# Patient Record
Sex: MALE | Race: WHITE
[De-identification: names, ages, dates, MRNs, and addresses within clinical notes are randomized per-mention and may not be internally consistent; named-entity substitution may affect disease eponyms.]

---

## 2017-03-27 ENCOUNTER — HOSPITAL ENCOUNTER (EMERGENCY)
Dept: HOSPITAL 53 - M ED | Age: 47
Discharge: HOME | End: 2017-03-27
Payer: COMMERCIAL

## 2017-03-27 VITALS — SYSTOLIC BLOOD PRESSURE: 148 MMHG | DIASTOLIC BLOOD PRESSURE: 86 MMHG

## 2017-03-27 VITALS — BODY MASS INDEX: 25.67 KG/M2 | WEIGHT: 200 LBS | HEIGHT: 74 IN

## 2017-03-27 DIAGNOSIS — R00.2: Primary | ICD-10-CM

## 2017-03-27 LAB
ANION GAP SERPL CALC-SCNC: 8 MEQ/L (ref 8–16)
BASOPHILS # BLD AUTO: 0 K/MM3 (ref 0–0.2)
BASOPHILS NFR BLD AUTO: 0.8 % (ref 0–1)
BUN SERPL-MCNC: 17 MG/DL (ref 7–18)
CALCIUM SERPL-MCNC: 9.2 MG/DL (ref 8.5–10.1)
CHLORIDE SERPL-SCNC: 102 MEQ/L (ref 98–107)
CO2 SERPL-SCNC: 31 MEQ/L (ref 21–32)
CREAT SERPL-MCNC: 0.81 MG/DL (ref 0.7–1.3)
EOSINOPHIL # BLD AUTO: 0.1 K/MM3 (ref 0–0.5)
EOSINOPHIL NFR BLD AUTO: 1.4 % (ref 0–3)
ERYTHROCYTE [DISTWIDTH] IN BLOOD BY AUTOMATED COUNT: 12.2 % (ref 11.5–14.5)
GFR SERPL CREATININE-BSD FRML MDRD: > 60 ML/MIN/{1.73_M2} (ref 60–?)
GLUCOSE SERPL-MCNC: 102 MG/DL (ref 70–105)
LARGE UNSTAINED CELL #: 0.2 K/MM3 (ref 0–0.4)
LARGE UNSTAINED CELL %: 2.7 % (ref 0–4)
LYMPHOCYTES # BLD AUTO: 2.5 K/MM3 (ref 1.5–4.5)
LYMPHOCYTES NFR BLD AUTO: 34.9 % (ref 24–44)
MAGNESIUM SERPL-MCNC: 2.2 MG/DL (ref 1.8–2.4)
MCH RBC QN AUTO: 31.4 PG (ref 27–33)
MCHC RBC AUTO-ENTMCNC: 34.2 G/DL (ref 32–36.5)
MCV RBC AUTO: 91.9 FL (ref 80–96)
MONOCYTES # BLD AUTO: 0.4 K/MM3 (ref 0–0.8)
MONOCYTES NFR BLD AUTO: 5.5 % (ref 0–5)
NEUTROPHILS # BLD AUTO: 3.8 K/MM3 (ref 1.8–7.7)
NEUTROPHILS NFR BLD AUTO: 54.7 % (ref 36–66)
PLATELET # BLD AUTO: 317 K/MM3 (ref 150–450)
POTASSIUM SERPL-SCNC: 3.8 MEQ/L (ref 3.5–5.1)
SODIUM SERPL-SCNC: 141 MEQ/L (ref 136–145)
WBC # BLD AUTO: 7 K/MM3 (ref 4–10)

## 2017-03-27 NOTE — REP
Clinical:  Chest pain .

 

Comparison: 09/08/2009 .

 

Technique:  PA and lateral.

 

Findings:

The mediastinum and cardiac silhouette are normal.  The lung fields are clear and

without acute consolidation, effusion, or pneumothorax.  The skeletal structures

are intact and normal.

 

Impression:

1.   No acute cardiopulmonary process.

 

 

Signed by

Augusto Marin MD 03/27/2017 07:39 A

## 2017-03-27 NOTE — ECGEPIP
Stationary ECG Study

                           [facility] - ED

                                       

                                       Test Date:    2017

Pat Name:     HARIKA HARDING          Department:   

Patient ID:   [id number]                 Room:         -

Gender:       M                        Technician:   [email or web address]

:          1970               Requested By: ALLYN AMAYA

Order Number: [id number]     Reading MD:   Yisel Rascon

                                 Measurements

Intervals                              Axis          

Rate:         98                       P:            58

KS:           165                      QRS:          7

QRSD:         102                      T:            42

QT:           329                                    

QTc:          420                                    

                           Interpretive Statements

SINUS RHYTHM

BASELINE ARTIFACT LIMITS INTERPRETATION

NO PRIOR FOR COMPARISON

Electronically Signed On 3- 9:41:55 EDT by Yisel Rascon

## 2019-02-25 ENCOUNTER — HOSPITAL ENCOUNTER (OUTPATIENT)
Dept: HOSPITAL 53 - M LAB REF | Age: 49
End: 2019-02-25
Attending: NURSE PRACTITIONER
Payer: COMMERCIAL

## 2019-02-25 DIAGNOSIS — R35.0: Primary | ICD-10-CM

## 2022-12-16 ENCOUNTER — HOSPITAL ENCOUNTER (EMERGENCY)
Dept: HOSPITAL 53 - M ED | Age: 52
Discharge: HOME | End: 2022-12-16
Payer: COMMERCIAL

## 2022-12-16 VITALS — HEIGHT: 74 IN | WEIGHT: 205.03 LBS | BODY MASS INDEX: 26.31 KG/M2

## 2022-12-16 VITALS — DIASTOLIC BLOOD PRESSURE: 71 MMHG | SYSTOLIC BLOOD PRESSURE: 116 MMHG

## 2022-12-16 DIAGNOSIS — N21.0: Primary | ICD-10-CM

## 2022-12-16 DIAGNOSIS — N13.4: ICD-10-CM

## 2022-12-16 DIAGNOSIS — R00.2: ICD-10-CM

## 2022-12-16 DIAGNOSIS — Z82.49: ICD-10-CM

## 2022-12-16 DIAGNOSIS — N23: ICD-10-CM

## 2022-12-16 DIAGNOSIS — Z87.891: ICD-10-CM

## 2022-12-16 DIAGNOSIS — Z87.442: ICD-10-CM

## 2022-12-16 LAB
ALBUMIN SERPL BCG-MCNC: 3.7 G/DL (ref 3.2–5.2)
ALP SERPL-CCNC: 70 U/L (ref 46–116)
ALT SERPL W P-5'-P-CCNC: 27 U/L (ref 7–40)
AST SERPL-CCNC: 18 U/L (ref ?–34)
BASOPHILS # BLD AUTO: 0 10^3/UL (ref 0–0.2)
BASOPHILS NFR BLD AUTO: 0.2 % (ref 0–1)
BILIRUB CONJ SERPL-MCNC: 0.1 MG/DL (ref ?–0.4)
BILIRUB SERPL-MCNC: 0.5 MG/DL (ref 0.3–1.2)
BUN SERPL-MCNC: 19 MG/DL (ref 9–23)
CALCIUM SERPL-MCNC: 9.5 MG/DL (ref 8.5–10.1)
CHLORIDE SERPL-SCNC: 105 MMOL/L (ref 98–107)
CO2 SERPL-SCNC: 22 MMOL/L (ref 20–31)
CREAT SERPL-MCNC: 0.79 MG/DL (ref 0.7–1.3)
EOSINOPHIL # BLD AUTO: 0 10^3/UL (ref 0–0.5)
EOSINOPHIL NFR BLD AUTO: 0 % (ref 0–3)
GFR SERPL CREATININE-BSD FRML MDRD: > 60 ML/MIN/{1.73_M2} (ref 56–?)
GLUCOSE SERPL-MCNC: 119 MG/DL (ref 60–100)
HCT VFR BLD AUTO: 44.1 % (ref 42–52)
HGB BLD-MCNC: 14.6 G/DL (ref 13.5–17.5)
LIPASE SERPL-CCNC: 28 U/L (ref 12–53)
LYMPHOCYTES # BLD AUTO: 0.8 10^3/UL (ref 1.5–5)
LYMPHOCYTES NFR BLD AUTO: 5.2 % (ref 24–44)
MCH RBC QN AUTO: 30.8 PG (ref 27–33)
MCHC RBC AUTO-ENTMCNC: 33.1 G/DL (ref 32–36.5)
MCV RBC AUTO: 93 FL (ref 80–96)
MONOCYTES # BLD AUTO: 0.5 10^3/UL (ref 0–0.8)
MONOCYTES NFR BLD AUTO: 3.2 % (ref 2–8)
NEUTROPHILS # BLD AUTO: 13.7 10^3/UL (ref 1.5–8.5)
NEUTROPHILS NFR BLD AUTO: 90.9 % (ref 36–66)
PLATELET # BLD AUTO: 318 10^3/UL (ref 150–450)
POTASSIUM SERPL-SCNC: 4.3 MMOL/L (ref 3.5–5.1)
PROT SERPL-MCNC: 7.1 G/DL (ref 5.7–8.2)
RBC # BLD AUTO: 4.74 10^6/UL (ref 4.3–6.1)
SODIUM SERPL-SCNC: 139 MMOL/L (ref 136–145)
WBC # BLD AUTO: 15.1 10^3/UL (ref 4–10)

## 2025-01-27 ENCOUNTER — HOSPITAL ENCOUNTER (OUTPATIENT)
Dept: HOSPITAL 53 - M LAB REF | Age: 55
End: 2025-01-27
Attending: NURSE PRACTITIONER
Payer: COMMERCIAL

## 2025-01-27 DIAGNOSIS — J02.9: Primary | ICD-10-CM

## 2025-01-27 LAB — RSV RNA NPH QL NAA+PROBE: NEGATIVE

## 2025-05-06 ENCOUNTER — HOSPITAL ENCOUNTER (OUTPATIENT)
Dept: HOSPITAL 53 - M LAB REF | Age: 55
End: 2025-05-06
Attending: NURSE PRACTITIONER
Payer: COMMERCIAL

## 2025-05-06 DIAGNOSIS — J06.9: Primary | ICD-10-CM

## 2025-05-06 LAB — RSV RNA NPH QL NAA+PROBE: NEGATIVE
